# Patient Record
Sex: MALE | Race: ASIAN | NOT HISPANIC OR LATINO | URBAN - METROPOLITAN AREA
[De-identification: names, ages, dates, MRNs, and addresses within clinical notes are randomized per-mention and may not be internally consistent; named-entity substitution may affect disease eponyms.]

---

## 2018-11-18 ENCOUNTER — EMERGENCY (EMERGENCY)
Facility: HOSPITAL | Age: 58
LOS: 1 days | Discharge: HOME | End: 2018-11-18
Attending: EMERGENCY MEDICINE
Payer: COMMERCIAL

## 2018-11-18 VITALS
OXYGEN SATURATION: 96 % | DIASTOLIC BLOOD PRESSURE: 82 MMHG | SYSTOLIC BLOOD PRESSURE: 134 MMHG | TEMPERATURE: 99 F | HEART RATE: 97 BPM | RESPIRATION RATE: 17 BRPM

## 2018-11-18 VITALS — WEIGHT: 149.91 LBS

## 2018-11-18 PROCEDURE — 73130 X-RAY EXAM OF HAND: CPT | Mod: 26,RT

## 2018-11-18 PROCEDURE — 99283 EMERGENCY DEPT VISIT LOW MDM: CPT | Mod: 25

## 2018-11-18 PROCEDURE — 73130 X-RAY EXAM OF HAND: CPT

## 2018-11-18 PROCEDURE — 99283 EMERGENCY DEPT VISIT LOW MDM: CPT

## 2018-11-18 RX ORDER — IBUPROFEN 200 MG
1 TABLET ORAL
Qty: 28 | Refills: 0 | OUTPATIENT
Start: 2018-11-18 | End: 2018-11-24

## 2018-11-18 RX ORDER — IBUPROFEN 200 MG
600 TABLET ORAL ONCE
Qty: 0 | Refills: 0 | Status: COMPLETED | OUTPATIENT
Start: 2018-11-18 | End: 2018-11-18

## 2018-11-18 RX ADMIN — Medication 600 MILLIGRAM(S): at 14:39

## 2018-11-18 RX ADMIN — Medication 600 MILLIGRAM(S): at 14:00

## 2018-11-18 NOTE — ED PROVIDER NOTE - OBJECTIVE STATEMENT
58 y/o M pt with no significant PMHx presents to ED c/o pain and swelling to ESTELLE good s/p fall 1.5 weeks ago. Pt reports that his finger feels stiff every morning and is concerned, thus presented to ED for evaluation.

## 2018-11-18 NOTE — ED ADULT NURSE NOTE - NSIMPLEMENTINTERV_GEN_ALL_ED
Implemented All Fall Risk Interventions:  [place] to call system. Call bell, personal items and telephone within reach. Instruct patient to call for assistance. Room bathroom lighting operational. Non-slip footwear when patient is off stretcher. Physically safe environment: no spills, clutter or unnecessary equipment. Stretcher in lowest position, wheels locked, appropriate side rails in place. Provide visual cue, wrist band, yellow gown, etc. Monitor gait and stability. Monitor for mental status changes and reorient to person, place, and time. Review medications for side effects contributing to fall risk. Reinforce activity limits and safety measures with patient and family.

## 2018-11-18 NOTE — ED PROVIDER NOTE - MEDICAL DECISION MAKING DETAILS
Will obtain X-ray of R hand then reassess. Will obtain X-ray of R hand then reassess.pain control.warm soakes.hand brace

## 2018-11-18 NOTE — ED PROCEDURE NOTE - CPROC ED POST PROC CARE GUIDE1
Elevate the injured extremity as instructed./Instructed patient/caregiver to follow-up with primary care physician./Instructed patient/caregiver regarding signs and symptoms of infection./Keep the cast/splint/dressing clean and dry./Verbal/written post procedure instructions were given to patient/caregiver.

## 2019-03-24 ENCOUNTER — EMERGENCY (EMERGENCY)
Facility: HOSPITAL | Age: 59
LOS: 1 days | Discharge: HOME | End: 2019-03-24
Attending: EMERGENCY MEDICINE
Payer: COMMERCIAL

## 2019-03-24 VITALS
HEIGHT: 66 IN | TEMPERATURE: 99 F | DIASTOLIC BLOOD PRESSURE: 90 MMHG | WEIGHT: 134.92 LBS | OXYGEN SATURATION: 96 % | HEART RATE: 106 BPM | RESPIRATION RATE: 16 BRPM | SYSTOLIC BLOOD PRESSURE: 145 MMHG

## 2019-03-24 PROCEDURE — 99284 EMERGENCY DEPT VISIT MOD MDM: CPT | Mod: 25

## 2019-03-24 PROCEDURE — 70486 CT MAXILLOFACIAL W/O DYE: CPT

## 2019-03-24 RX ORDER — ACETAMINOPHEN 500 MG
650 TABLET ORAL ONCE
Qty: 0 | Refills: 0 | Status: COMPLETED | OUTPATIENT
Start: 2019-03-24 | End: 2019-03-24

## 2019-03-24 RX ADMIN — Medication 650 MILLIGRAM(S): at 23:29

## 2019-03-24 NOTE — ED PROVIDER NOTE - CLINICAL SUMMARY MEDICAL DECISION MAKING FREE TEXT BOX
CT max/facial reported no fracture.  Pt is well appearing walking with normal gait, stable for discharge and follow up with medical doctor. Pt educated on care and need for follow up. Discussed anticipatory guidance and return precautions. Questions answered. I had a detailed discussion with the patient and/or guardian regarding the historical points, exam findings, and any diagnostic results supporting the discharge diagnosis.
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
## 2019-03-24 NOTE — ED PROVIDER NOTE - EYES, MLM
Extra ocular muscles intact, pupils 3mm and reactive to light, no photophobia, no pain with eye movement. no hyphema. OD/OS 2020

## 2019-03-24 NOTE — ED PROVIDER NOTE - NSFOLLOWUPCLINICS_GEN_ALL_ED_FT
[facility]  [facility]  [address] - [address] [place] [address]  Phone: [phone]  Fax: [phone]  Follow Up Time:

## 2019-03-24 NOTE — ED PROVIDER NOTE - NSFOLLOWUPINSTRUCTIONS_ED_ALL_ED_FT
Return if you have pain, difficulty seeing, headache, any concerns.  See your doctor as soon as possible (within 1-2 days).   If you need further assistance for appointments you can contact the [facility] Coordinator at [phone]. In addition our outpatient [facility] is located at [address] [place] [address], tel: [phone].
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
## 2019-03-25 PROCEDURE — 70486 CT MAXILLOFACIAL W/O DYE: CPT | Mod: 26

## 2019-03-25 RX ORDER — ACETAMINOPHEN 500 MG
2 TABLET ORAL
Qty: 20 | Refills: 0 | OUTPATIENT
Start: 2019-03-25